# Patient Record
Sex: MALE | Employment: UNEMPLOYED | URBAN - METROPOLITAN AREA
[De-identification: names, ages, dates, MRNs, and addresses within clinical notes are randomized per-mention and may not be internally consistent; named-entity substitution may affect disease eponyms.]

---

## 2017-02-24 ENCOUNTER — OFFICE VISIT (OUTPATIENT)
Dept: FAMILY MEDICINE | Facility: CLINIC | Age: 22
End: 2017-02-24
Payer: COMMERCIAL

## 2017-02-24 VITALS
BODY MASS INDEX: 20.52 KG/M2 | WEIGHT: 151.5 LBS | OXYGEN SATURATION: 97 % | RESPIRATION RATE: 23 BRPM | TEMPERATURE: 98.2 F | HEIGHT: 72 IN | DIASTOLIC BLOOD PRESSURE: 62 MMHG | SYSTOLIC BLOOD PRESSURE: 108 MMHG | HEART RATE: 83 BPM

## 2017-02-24 DIAGNOSIS — E10.9 TYPE 1 DIABETES MELLITUS WITHOUT COMPLICATION (H): Primary | ICD-10-CM

## 2017-02-24 LAB
BASOPHILS # BLD AUTO: 0.1 10E9/L (ref 0–0.2)
BASOPHILS NFR BLD AUTO: 1.2 %
DIFFERENTIAL METHOD BLD: NORMAL
EOSINOPHIL # BLD AUTO: 0.1 10E9/L (ref 0–0.7)
EOSINOPHIL NFR BLD AUTO: 2.3 %
ERYTHROCYTE [DISTWIDTH] IN BLOOD BY AUTOMATED COUNT: 11.9 % (ref 10–15)
HBA1C MFR BLD: 9.6 % (ref 4.3–6)
HCT VFR BLD AUTO: 45.5 % (ref 40–53)
HGB BLD-MCNC: 15.5 G/DL (ref 13.3–17.7)
LYMPHOCYTES # BLD AUTO: 1.4 10E9/L (ref 0.8–5.3)
LYMPHOCYTES NFR BLD AUTO: 32.6 %
MCH RBC QN AUTO: 31.1 PG (ref 26.5–33)
MCHC RBC AUTO-ENTMCNC: 34.1 G/DL (ref 31.5–36.5)
MCV RBC AUTO: 91 FL (ref 78–100)
MONOCYTES # BLD AUTO: 0.5 10E9/L (ref 0–1.3)
MONOCYTES NFR BLD AUTO: 10.5 %
NEUTROPHILS # BLD AUTO: 2.3 10E9/L (ref 1.6–8.3)
NEUTROPHILS NFR BLD AUTO: 53.4 %
PLATELET # BLD AUTO: 205 10E9/L (ref 150–450)
RBC # BLD AUTO: 4.99 10E12/L (ref 4.4–5.9)
WBC # BLD AUTO: 4.3 10E9/L (ref 4–11)

## 2017-02-24 PROCEDURE — 83036 HEMOGLOBIN GLYCOSYLATED A1C: CPT | Performed by: PHYSICIAN ASSISTANT

## 2017-02-24 PROCEDURE — 36415 COLL VENOUS BLD VENIPUNCTURE: CPT | Performed by: PHYSICIAN ASSISTANT

## 2017-02-24 PROCEDURE — 99203 OFFICE O/P NEW LOW 30 MIN: CPT | Performed by: PHYSICIAN ASSISTANT

## 2017-02-24 PROCEDURE — 85025 COMPLETE CBC W/AUTO DIFF WBC: CPT | Performed by: PHYSICIAN ASSISTANT

## 2017-02-24 PROCEDURE — 80053 COMPREHEN METABOLIC PANEL: CPT | Performed by: PHYSICIAN ASSISTANT

## 2017-02-24 PROCEDURE — 99207 C FOOT EXAM  NO CHARGE: CPT | Performed by: PHYSICIAN ASSISTANT

## 2017-02-24 RX ORDER — NAPROXEN SODIUM 220 MG
TABLET ORAL
Refills: 4 | COMMUNITY
Start: 2016-06-10 | End: 2017-02-24

## 2017-02-24 RX ORDER — LANCETS 30 GAUGE
EACH MISCELLANEOUS
COMMUNITY
Start: 2016-06-10

## 2017-02-24 RX ORDER — NAPROXEN SODIUM 220 MG
TABLET ORAL
Qty: 1 EACH | Refills: 3 | Status: SHIPPED | OUTPATIENT
Start: 2017-02-24

## 2017-02-24 RX ORDER — INSULIN GLARGINE 100 [IU]/ML
INJECTION, SOLUTION SUBCUTANEOUS
COMMUNITY
Start: 2016-12-06 | End: 2017-02-24

## 2017-02-24 NOTE — MR AVS SNAPSHOT
After Visit Summary   2/24/2017    Sean Perez    MRN: [id number]           Patient Information     Date Of Birth          1995        Visit Information        Provider Department      2/24/2017 2:20 PM Jane Cruz PA-C [facility][note 3]        Today's Diagnoses     Type 1 diabetes mellitus without complication (H)    -  1      Care Instructions    Labs updated today.  Refills sent to pharmacy.  Continue to avoid processed foods.  Change Lantus 10-20 units nightly, adjust every few nights as tolerated.  Follow up with endocrinology.  Return to clinic with any worsening or changes in symptoms and follow up for routine care.         Follow-ups after your visit        Additional Services     ENDOCRINOLOGY ADULT REFERRAL       Your provider has referred you to: [facility]: Endocrinology and Diabetes Clinic [facility] [phone]   [email or web address]      Please be aware that coverage of these services is subject to the terms and limitations of your health insurance plan.  Call member services at your health plan with any benefit or coverage questions.      Please bring the following to your appointment:    >>   Any x-rays, CTs or MRIs which have been performed.  Contact the facility where they were done to arrange for  prior to your scheduled appointment.    >>   List of current medications   >>   This referral request   >>   Any documents/labs given to you for this referral                  Follow-up notes from your care team     Return in about 6 months (around 8/24/2017), or if symptoms worsen or fail to improve.      Who to contact     If you have questions or need follow up information about today's clinic visit or your schedule please contact [facility] directly at [phone].  Normal or non-critical lab and imaging results will be communicated to you by MyChart, letter or phone within 4 business  "days after the clinic has received the results. If you do not hear from us within 7 days, please contact the clinic through [email or web address] or phone. If you have a critical or abnormal lab result, we will notify you by phone as soon as possible.  Submit refill requests through [email or web address] or call your pharmacy and they will forward the refill request to us. Please allow 3 business days for your refill to be completed.          Additional Information About Your Visit        [email or web address] Information     [email or web address] lets you send messages to your doctor, view your test results, renew your prescriptions, schedule appointments and more. To sign up, go to [email or web address] . Click on \"Log in\" on the left side of the screen, which will take you to the Welcome page. Then click on \"Sign up Now\" on the right side of the page.     You will be asked to enter the access code listed below, as well as some personal information. Please follow the directions to create your username and password.     Your access code is: [email or web address]  Expires: 2017  6:31 AM     Your access code will  in 90 days. If you need help or a new code, please call your [place] clinic or [phone].        Care EveryWhere ID     This is your Care EveryWhere ID. This could be used by other organizations to access your [place] medical records  [id number]        Your Vitals Were     Pulse Temperature Respirations Height Pulse Oximetry BMI (Body Mass Index)    83 98.2  F (36.8  C) (Oral) 23 6' (1.829 m) 97% 20.55 kg/m2       Blood Pressure from Last 3 Encounters:   17 108/62    Weight from Last 3 Encounters:   17 151 lb 8 oz (68.7 kg)              We Performed the Following     CBC with platelets differential     Comprehensive metabolic panel     ENDOCRINOLOGY ADULT REFERRAL     FOOT EXAM     Hemoglobin A1c          Today's Medication Changes          These changes are accurate as of: 17  2:57 PM.  If you have any questions, ask your nurse or " "doctor.               Start taking these medicines.        Dose/Directions    insulin lispro 100 UNIT/ML injection   Commonly known as:  HumaLOG PEN   Used for:  Type 1 diabetes mellitus without complication (H)   Replaces:  insulin lispro 100 UNIT/ML injection   Started by:  Jane Cruz PA-C        1 unit for every 10 carbs and 1 unit correction for every 50 over 150   Quantity:  10 mL   Refills:  3         These medicines have changed or have updated prescriptions.        Dose/Directions    blood glucose monitoring test strip   Commonly known as:  ACCU-CHEK SMARTVIEW   This may have changed:  See the new instructions.   Used for:  Type 1 diabetes mellitus without complication (H)   Changed by:  Jane Cruz PA-C        TEST 4 TIMES A DAY AS DIRECTED   Quantity:  1 Box   Refills:  3       insulin glargine 100 UNIT/ML injection   Commonly known as:  LANTUS   This may have changed:  See the new instructions.   Used for:  Type 1 diabetes mellitus without complication (H)   Changed by:  Jane Cruz PA-C        Dose:  20 Units   Inject 20 Units Subcutaneous At Bedtime   Quantity:  10 mL   Refills:  3       * insulin syringe-needle U-100 30G X 1/2\" 0.3 ML   This may have changed:  See the new instructions.   Used for:  Type 1 diabetes mellitus without complication (H)   Changed by:  Jane Cruz PA-C        4-6 times daily   Quantity:  100 each   Refills:  3       * Insulin Syringe 30G X 5/16\" 0.3 ML Misc   This may have changed:  See the new instructions.   Used for:  Type 1 diabetes mellitus without complication (H)   Changed by:  Jane Cruz PA-C        USE 4-6 TIMES DAILY   Quantity:  1 each   Refills:  3       * Notice:  This list has 2 medication(s) that are the same as other medications prescribed for you. Read the directions carefully, and ask your doctor or other care provider to review them with you.      Stop taking these medicines if you " "haven't already. Please contact your care team if you have questions.     HumaLOG KWIKpen 100 UNIT/ML injection   Generic drug:  insulin lispro   Stopped by:  Jane Cruz PA-C           insulin aspart 100 UNIT/ML injection   Commonly known as:  NovoLOG PEN   Stopped by:  Jane Cruz PA-C           insulin detemir 100 UNIT/ML injection   Commonly known as:  LEVEMIR   Stopped by:  Jane Cruz PA-C           insulin lispro 100 UNIT/ML injection   Commonly known as:  HumaLOG   Replaced by:  insulin lispro 100 UNIT/ML injection   Stopped by:  Jane Cruz PA-C                Where to get your medicines      These medications were sent to [facility] [address]  [address] [facility] [address]     Phone:  [phone]     blood glucose monitoring test strip    insulin glargine 100 UNIT/ML injection    insulin lispro 100 UNIT/ML injection    Insulin Syringe 30G X 5/16\" 0.3 ML Misc    insulin syringe-needle U-100 30G X 1/2\" 0.3 ML                Primary Care Provider Office Phone # Fax #    Jane Cruz PA-C [phone]       [facility]       [address]            [facility] [address]        Thank you!     Thank you for choosing [facility]  for your care. Our goal is always to provide you with excellent care. Hearing back from our patients is one way we can continue to improve our services. Please take a few minutes to complete the written survey that you may receive in the mail after your visit with us. Thank you!             Your Updated Medication List - Protect others around you: Learn how to safely use, store and throw away your medicines at www.disposemymeds.org.          This list is accurate as of: 2/24/17  2:57 PM.  Always use your most recent med list.                   Brand Name Dispense Instructions for use    blood glucose monitoring test strip    " "ACCU-CHEK SMARTVIEW    1 Box    TEST 4 TIMES A DAY AS DIRECTED       GLUCAGEN HYPOKIT 1 MG Solr injection   Generic drug:  glucagon      INJECT 1 MG IN THE MUSCLE FOR DIABETIC SEIZURE OR UNCONSCIOUSNESS AS DIRECTED       insulin glargine 100 UNIT/ML injection    LANTUS    10 mL    Inject 20 Units Subcutaneous At Bedtime       insulin lispro 100 UNIT/ML injection    HumaLOG PEN    10 mL    1 unit for every 10 carbs and 1 unit correction for every 50 over 150       * insulin syringe-needle U-100 30G X 1/2\" 0.3 ML     100 each    4-6 times daily       * Insulin Syringe 30G X 5/16\" 0.3 ML Misc     1 each    USE 4-6 TIMES DAILY       Lancets Misc      4-6 timesdaily       * Notice:  This list has 2 medication(s) that are the same as other medications prescribed for you. Read the directions carefully, and ask your doctor or other care provider to review them with you.      "

## 2017-02-24 NOTE — PATIENT INSTRUCTIONS
Labs updated today.  Refills sent to pharmacy.  Continue to avoid processed foods.  Change Lantus 10-20 units nightly, adjust every few nights as tolerated/needed.  Follow up with endocrinology.  Return to clinic with any worsening or changes in symptoms and follow up for routine care.

## 2017-02-24 NOTE — PROGRESS NOTES
SUBJECTIVE:                                                    Sean Perez is a 21 year old male who presents to clinic today for the following health issues:    Diabetes Follow-up  Patient is checking blood sugars: 2-3 times daily.    Afternoon/[id number]  Results are as follows:    Diabetic concerns: None     Symptoms of hypoglycemia (low blood sugar): shaky     Paresthesias (numbness or burning in feet) or sores: No     Date of last diabetic eye exam: Dec 2016       Amount of exercise or physical activity: 4-5 days/week for an average of greater than 60 minutes    Problems taking medications regularly: Yes,  problems remembering to take    Medication side effects: none    Diet: regular (no restrictions)    Taking Levemir/Lantus - 20 units before bedtime and Novolog/Humalog 40-50 units per day (1 unit for every 10 carbs and 1 unit correction for every 50 over 150)    Patient seeing endocrinology through [facility], but change of insurance switched endocrinology.  Plans to get in with endocrinology in near future again though.    Most recent HgbA1C was 10.2 or so a few months ago - told to decreased processed foods for improvement in numbers, which he has done.  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 Will take it once every 3 nights sometimes.  History of being on 18 units in the past with better results.      Problem list and histories reviewed & adjusted, as indicated.  Additional history: as documented    Patient Active Problem List   Diagnosis     Type 1 diabetes mellitus without complication (H)     History reviewed. No pertinent past surgical history.    Social History   Substance Use Topics     Smoking status: Never Smoker     Smokeless tobacco: Not on file     Alcohol use Yes      Comment: once a month     Family History   Problem Relation Age of Onset     Hyperlipidemia Mother      Migraines Mother      OSTEOPOROSIS Mother      Type 2  "Diabetes Other      Hypertension Other          Current Outpatient Prescriptions   Medication Sig Dispense Refill     glucagon (GLUCAGEN HYPOKIT) 1 MG SOLR injection INJECT 1 MG IN THE MUSCLE FOR DIABETIC SEIZURE OR UNCONSCIOUSNESS AS DIRECTED       Lancets MISC 4-6 timesdaily       insulin lispro (HUMALOG PEN) 100 UNIT/ML injection 1 unit for every 10 carbs and 1 unit correction for every 50 over 150 10 mL 3     insulin glargine (LANTUS) 100 UNIT/ML injection Inject 20 Units Subcutaneous At Bedtime 10 mL 3     insulin syringe-needle U-100 30G X 1/2\" 0.3 ML 4-6 times daily 100 each 3     blood glucose monitoring (ACCU-CHEK SMARTVIEW) test strip TEST 4 TIMES A DAY AS DIRECTED 1 Box 3     Insulin Syringe 30G X 5/16\" 0.3 ML MISC USE 4-6 TIMES DAILY 1 each 3     [DISCONTINUED] insulin glargine (LANTUS) 100 UNIT/ML injection Inject Subcutaneous At Bedtime       [DISCONTINUED] insulin lispro (HUMALOG KWIKPEN) 100 UNIT/ML injection Inject Subcutaneous 3 times daily (before meals)       [DISCONTINUED] insulin lispro (HUMALOG) 100 UNIT/ML injection Inject 40-50 Units Subcutaneous       [DISCONTINUED] insulin glargine (LANTUS) 100 UNIT/ML injection Inject 20 units subcutaneously daily.       Allergies   Allergen Reactions     Dust Mites        ROS:  Constitutional, HEENT, cardiovascular, pulmonary, gi and gu systems are negative, except as otherwise noted.    OBJECTIVE:                                                    /62 (BP Location: Left arm, Patient Position: Chair, Cuff Size: Adult Regular)  Pulse 83  Temp 98.2  F (36.8  C) (Oral)  Resp 23  Ht 6' (1.829 m)  Wt 151 lb 8 oz (68.7 kg)  SpO2 97%  BMI 20.55 kg/m2  Body mass index is 20.55 kg/(m^2).  GENERAL: healthy, alert and no distress  EYES: Eyes grossly normal to inspection, PERRL and conjunctivae and sclerae normal  HENT: ear canals and TM's normal, nose and mouth without ulcers or lesions  RESP: lungs clear to auscultation - no rales, rhonchi or wheezes  CV: " regular rate and rhythm, normal S1 S2, no S3 or S4, no murmur, click or rub, no peripheral edema and peripheral pulses strong  ABDOMEN: soft, nontender, no hepatosplenomegaly, no masses and bowel sounds normal  MS: no gross musculoskeletal defects noted, no edema  PSYCH: mentation appears normal, affect normal/bright  Diabetic foot exam: normal DP and PT pulses, no trophic changes or ulcerative lesions and normal sensory exam    Diagnostic Test Results:  Results for orders placed or performed in visit on 02/24/17   CBC with platelets differential   Result Value Ref Range    WBC 4.3 4.0 - 11.0 10e9/L    RBC Count 4.99 4.4 - 5.9 10e12/L    Hemoglobin 15.5 13.3 - 17.7 g/dL    Hematocrit 45.5 40.0 - 53.0 %    MCV 91 78 - 100 fl    MCH 31.1 26.5 - 33.0 pg    MCHC 34.1 31.5 - 36.5 g/dL    RDW 11.9 10.0 - 15.0 %    Platelet Count 205 150 - 450 10e9/L    Diff Method Automated Method     % Neutrophils 53.4 %    % Lymphocytes 32.6 %    % Monocytes 10.5 %    % Eosinophils 2.3 %    % Basophils 1.2 %    Absolute Neutrophil 2.3 1.6 - 8.3 10e9/L    Absolute Lymphocytes 1.4 0.8 - 5.3 10e9/L    Absolute Monocytes 0.5 0.0 - 1.3 10e9/L    Absolute Eosinophils 0.1 0.0 - 0.7 10e9/L    Absolute Basophils 0.1 0.0 - 0.2 10e9/L   Hemoglobin A1c   Result Value Ref Range    Hemoglobin A1C 9.6 (H) 4.3 - 6.0 %         ASSESSMENT/PLAN:                                                    Diabetes Type I, A1c Controlled , insulin dependent   Associated with the following complications    Renal Complications:  None    Ophthalmologic Complications: None    Neurologic Complications: None    Peripheral Vascular Complications:  None    Other: None   Plan:  Labs:  See orders, A1C and Complete Metabolic Panel  Continue short acting insulin as directed  Make sure taking Lantus nightly though - 10 units every night to start then can increase 2-3 units if needed for better control.  Endocrinology referral placed today as well.  Medications:  Insulin -  "refills sent to pharmacy.        ICD-10-CM    1. Type 1 diabetes mellitus without complication (H) E10.9 CBC with platelets differential     Comprehensive metabolic panel     Hemoglobin A1c     ENDOCRINOLOGY ADULT REFERRAL     FOOT EXAM     insulin lispro (HUMALOG PEN) 100 UNIT/ML injection     insulin glargine (LANTUS) 100 UNIT/ML injection     insulin syringe-needle U-100 30G X 1/2\" 0.3 ML     blood glucose monitoring (ACCU-CHEK SMARTVIEW) test strip     Insulin Syringe 30G X 5/16\" 0.3 ML MISC       Patient Instructions   Labs updated today.  Refills sent to pharmacy.  Continue to avoid processed foods.  Change Lantus 10-20 units nightly, adjust every few nights as tolerated/needed.  Follow up with endocrinology.  Return to clinic with any worsening or changes in symptoms and follow up for routine care.       Jane Cruz PA-C  [facility]  "

## 2017-02-24 NOTE — LETTER
[facility]   [address] [place]   [address]  [phone]    February 28, 2017      Sean Perez  [address] [facility] [address]              Dear Mr. Perez,    Your glucose is quiet high which is consistent with high A1c.   Your kidney and liver functions are fine.    Grzegorz Moctezuma MD behalf of Dima Cruz    Results for orders placed or performed in visit on 02/24/17   CBC with platelets differential   Result Value Ref Range    WBC 4.3 4.0 - 11.0 10e9/L    RBC Count 4.99 4.4 - 5.9 10e12/L    Hemoglobin 15.5 13.3 - 17.7 g/dL    Hematocrit 45.5 40.0 - 53.0 %    MCV 91 78 - 100 fl    MCH 31.1 26.5 - 33.0 pg    MCHC 34.1 31.5 - 36.5 g/dL    RDW 11.9 10.0 - 15.0 %    Platelet Count 205 150 - 450 10e9/L    Diff Method Automated Method     % Neutrophils 53.4 %    % Lymphocytes 32.6 %    % Monocytes 10.5 %    % Eosinophils 2.3 %    % Basophils 1.2 %    Absolute Neutrophil 2.3 1.6 - 8.3 10e9/L    Absolute Lymphocytes 1.4 0.8 - 5.3 10e9/L    Absolute Monocytes 0.5 0.0 - 1.3 10e9/L    Absolute Eosinophils 0.1 0.0 - 0.7 10e9/L    Absolute Basophils 0.1 0.0 - 0.2 10e9/L   Comprehensive metabolic panel   Result Value Ref Range    Sodium 138 133 - 144 mmol/L    Potassium 4.1 3.4 - 5.3 mmol/L    Chloride 101 94 - 109 mmol/L    Carbon Dioxide 30 20 - 32 mmol/L    Anion Gap 7 3 - 14 mmol/L    Glucose 397 (H) 70 - 99 mg/dL    Urea Nitrogen 14 7 - 30 mg/dL    Creatinine 0.81 0.66 - 1.25 mg/dL    GFR Estimate >90  Non  GFR Calc   >60 mL/min/1.7m2    GFR Estimate If Black >90   GFR Calc   >60 mL/min/1.7m2    Calcium 9.3 8.5 - 10.1 mg/dL    Bilirubin Total 0.8 0.2 - 1.3 mg/dL    Albumin 4.2 3.4 - 5.0 g/dL    Protein Total 6.9 6.8 - 8.8 g/dL    Alkaline Phosphatase 91 40 - 150 U/L    ALT 16 0 - 70 U/L    AST 11 0 - 45 U/L   Hemoglobin A1c   Result Value Ref Range    Hemoglobin A1C 9.6 (H) 4.3 - 6.0 %           Sincerely,    Grzegorz Moctezuma MD/nr  Grzegorz  Jose Elias HAAS behalf of Dima Cruz

## 2017-02-24 NOTE — NURSING NOTE
Chief Complaint   Patient presents with     Diabetes       Initial /62 (BP Location: Left arm, Patient Position: Chair, Cuff Size: Adult Regular)  Pulse 83  Temp 98.2  F (36.8  C) (Oral)  Resp 23  Ht 6' (1.829 m)  Wt 151 lb 8 oz (68.7 kg)  SpO2 97%  BMI 20.55 kg/m2 Estimated body mass index is 20.55 kg/(m^2) as calculated from the following:    Height as of this encounter: 6' (1.829 m).    Weight as of this encounter: 151 lb 8 oz (68.7 kg).  Medication Reconciliation: complete     Nou Lavonne, CMA

## 2017-02-25 LAB
ALBUMIN SERPL-MCNC: 4.2 G/DL (ref 3.4–5)
ALP SERPL-CCNC: 91 U/L (ref 40–150)
ALT SERPL W P-5'-P-CCNC: 16 U/L (ref 0–70)
ANION GAP SERPL CALCULATED.3IONS-SCNC: 7 MMOL/L (ref 3–14)
AST SERPL W P-5'-P-CCNC: 11 U/L (ref 0–45)
BILIRUB SERPL-MCNC: 0.8 MG/DL (ref 0.2–1.3)
BUN SERPL-MCNC: 14 MG/DL (ref 7–30)
CALCIUM SERPL-MCNC: 9.3 MG/DL (ref 8.5–10.1)
CHLORIDE SERPL-SCNC: 101 MMOL/L (ref 94–109)
CO2 SERPL-SCNC: 30 MMOL/L (ref 20–32)
CREAT SERPL-MCNC: 0.81 MG/DL (ref 0.66–1.25)
GFR SERPL CREATININE-BSD FRML MDRD: ABNORMAL ML/MIN/1.7M2
GLUCOSE SERPL-MCNC: 397 MG/DL (ref 70–99)
POTASSIUM SERPL-SCNC: 4.1 MMOL/L (ref 3.4–5.3)
PROT SERPL-MCNC: 6.9 G/DL (ref 6.8–8.8)
SODIUM SERPL-SCNC: 138 MMOL/L (ref 133–144)

## 2017-04-06 DIAGNOSIS — E10.9 TYPE 1 DIABETES MELLITUS WITHOUT COMPLICATION (H): ICD-10-CM

## 2017-04-06 NOTE — TELEPHONE ENCOUNTER
Pt would like to receive 3 vials of both insulins at a time for 3 months.  If this is ok, please send new rx for 3 vials.  I've queued both up for you.  I didn't add refills or associate a diagnosis code, so you may want to do that as well.     He is using vials, not pens.  Each vial should be used only for 28 days, so 3 vials is a 3-month supply even though he wouldn't necessarily use it all.     Thanks-    Marycarmen Galdamez, [facility]  [phone]

## 2017-05-15 NOTE — TELEPHONE ENCOUNTER
Pts mother, Kizzy, stopped by  very upset that pt has again only been given a 1 month supply rather than a 3 month. Please advise.    Ruby Crook  Patient Representative

## 2017-05-15 NOTE — TELEPHONE ENCOUNTER
--I do not see that we have consent to communicate with mother.  --I see in this encounter that pharmacist cued a three month supply of both meds for Dima to sign off on 4/7/17.  --I called Marycarmen in our pharmacy to review the orders for insulin in Sean's chart.  --Marycarmen agrees with me that he does have orders for both insulins for at least a 3 month supply.      --I called Sean and told him that his mother was concerned that he was not getting the correct supply of his insulins.  --Sean did not express any concern about this to me.  --I told Sean that we are not able to share medical info with his mother without a consent to communicate.  --I also told Sean that I discussed his orders with Marycarmen in the pharmacy and she does have an order for both insulins for at least a three month supply.    --I also attempted to call Sean's mother to let her know about confidentiality but she is not at home.      Kavitha Kaye RN

## 2017-07-08 DIAGNOSIS — E10.9 TYPE 1 DIABETES MELLITUS WITHOUT COMPLICATION (H): ICD-10-CM

## 2017-07-08 NOTE — TELEPHONE ENCOUNTER
"Medication Detail      Disp Refills Start End JULIANNA   Insulin Syringe 30G X 5/16\" 0.3 ML MISC 1 each 3 2/24/2017  No   Sig: USE 4-6 TIMES DAILY   Class: E-Prescribe   Notes to Pharmacy: Patient prefers shorter needle option of these 2   Order: [id number]        Last Office Visit with [facility], P or [facility] prescribing provider: 2/24/2017                                               "

## 2017-07-10 RX ORDER — SYRING-NEEDL,DISP,INSUL,0.3 ML 30 G X1/2"
SYRINGE, EMPTY DISPOSABLE MISCELLANEOUS
Qty: 180 EACH | Refills: 1 | Status: SHIPPED | OUTPATIENT
Start: 2017-07-10 | End: 2017-10-30

## 2017-10-30 DIAGNOSIS — E10.9 TYPE 1 DIABETES MELLITUS WITHOUT COMPLICATION (H): ICD-10-CM

## 2017-11-01 NOTE — TELEPHONE ENCOUNTER
Reason for Call:  Medication or medication refill:    Do you use a [facility]?  Name of the pharmacy and phone number for the current request:  [facility] - [phone]    Name of the medication requested: HUMALOG 100 UNIT/ML injection, test strips, & pen needles    Other request: Patients mother is requesting a refill. Patient has only 1 days work left. Please advise, thank you!    Can we leave a detailed message on this number? Not Applicable    Phone number patient can be reached at: [phone]    Best Time: anytime    Call taken on 11/1/2017 at 2:14 PM by Elana Ramos

## 2017-11-02 RX ORDER — SYRING-NEEDL,DISP,INSUL,0.3 ML 30 G X1/2"
SYRINGE, EMPTY DISPOSABLE MISCELLANEOUS
Qty: 200 EACH | Refills: 1 | Status: SHIPPED | OUTPATIENT
Start: 2017-11-02

## 2017-11-02 NOTE — TELEPHONE ENCOUNTER
Is totally out of his Humalog insulin. Asked to have this sent to a pharmacy that is open.  I called it in to [facility] [place] [place] per Sean's mother's request. I called in the following:  HUMALOG 100 UNIT/ML injection [Pharmacy Med Name: HUMALOG 100UNIT/ML SOLN]  INJECT 1 UNIT UNDER THE SKIN FOR EVERY 10 CARBS, AND ONE UNIT CORRECTION FOR EVERY 50 OVER 150, Disp-30 mL, R-0, E-Prescribe  Routed:P 32834  ALBERTO Agee RN [facility]

## 2017-11-02 NOTE — TELEPHONE ENCOUNTER
Devaughn Augustine,  --I am not able to see the order sent.  --Did you sent via EPIC?  --I see order still in order screen.  --Did you happen to order the supplies that were requested too?    I started a ticket on this encounter so am leaving it as is right now.    Thank you,  Kavitha Kaye RN

## 2019-02-21 DIAGNOSIS — E10.9 TYPE 1 DIABETES MELLITUS WITHOUT COMPLICATION (H): ICD-10-CM

## 2019-02-25 NOTE — TELEPHONE ENCOUNTER
"Requested Prescriptions   Pending Prescriptions Disp Refills     insulin syringe-needle U-100 (BD INSULIN SYRINGE ULTRAFINE) 30G X 1/2\" 0.3 ML miscellaneous  Last Written Prescription Date:  4/7/2017  Last Fill Quantity: 30ml,  # refills: 0   Last Office Visit: 2/24/2017   Future Office Visit:      200 each 1     Sig: Use  syringes daily or as directed.    Diabetic Supplies Protocol Failed - 2/21/2019  4:59 PM       Failed - Recent (6 mo) or future (30 days) visit within the authorizing provider's specialty    Patient had office visit in the last 6 months or has a visit in the next 30 days with authorizing provider.  See \"Patient Info\" tab in inbasket, or \"Choose Columns\" in Meds & Orders section of the refill encounter.           Passed - Medication is active on med list       Passed - Patient is 18 years of age or older          "

## 2019-02-25 NOTE — TELEPHONE ENCOUNTER
Patient has not been seen at [facility] for two years and we have not ordered anything for about that long. I called pharmacy and they say since they sent us this request they have changed their request to a Dr.Mucha ( I am not sure of that spelling.) I told pharmacy if they have trouble getting requests authorized to let us know.

## 2022-01-27 NOTE — TELEPHONE ENCOUNTER
Prescription approved per [facility] Refill Protocol.  ANUP Arthru     no...

## 2023-03-25 ENCOUNTER — HOSPITAL ENCOUNTER (EMERGENCY)
Facility: CLINIC | Age: 28
Discharge: HOME HEALTH | End: 2023-03-25
Attending: EMERGENCY MEDICINE | Admitting: EMERGENCY MEDICINE
Payer: COMMERCIAL

## 2023-03-25 ENCOUNTER — APPOINTMENT (OUTPATIENT)
Dept: GENERAL RADIOLOGY | Facility: CLINIC | Age: 28
End: 2023-03-25
Attending: EMERGENCY MEDICINE
Payer: COMMERCIAL

## 2023-03-25 ENCOUNTER — TELEPHONE (OUTPATIENT)
Dept: NURSING | Facility: CLINIC | Age: 28
End: 2023-03-25

## 2023-03-25 ENCOUNTER — NURSE TRIAGE (OUTPATIENT)
Dept: NURSING | Facility: CLINIC | Age: 28
End: 2023-03-25

## 2023-03-25 VITALS
OXYGEN SATURATION: 98 % | HEIGHT: 72 IN | HEART RATE: 95 BPM | DIASTOLIC BLOOD PRESSURE: 79 MMHG | TEMPERATURE: 98.8 F | WEIGHT: 173.94 LBS | BODY MASS INDEX: 23.56 KG/M2 | SYSTOLIC BLOOD PRESSURE: 107 MMHG | RESPIRATION RATE: 18 BRPM

## 2023-03-25 DIAGNOSIS — J06.9 VIRAL URI WITH COUGH: ICD-10-CM

## 2023-03-25 DIAGNOSIS — R07.81 PLEURITIC CHEST PAIN: ICD-10-CM

## 2023-03-25 LAB
ANION GAP SERPL CALCULATED.3IONS-SCNC: 14 MMOL/L (ref 7–15)
BASOPHILS # BLD AUTO: 0 10E3/UL (ref 0–0.2)
BASOPHILS NFR BLD AUTO: 0 %
BUN SERPL-MCNC: 13.8 MG/DL (ref 6–20)
CALCIUM SERPL-MCNC: 9.7 MG/DL (ref 8.6–10)
CHLORIDE SERPL-SCNC: 100 MMOL/L (ref 98–107)
CREAT SERPL-MCNC: 0.83 MG/DL (ref 0.67–1.17)
D DIMER PPP FEU-MCNC: <0.27 UG/ML FEU (ref 0–0.5)
DEPRECATED HCO3 PLAS-SCNC: 25 MMOL/L (ref 22–29)
EOSINOPHIL # BLD AUTO: 0.1 10E3/UL (ref 0–0.7)
EOSINOPHIL NFR BLD AUTO: 1 %
ERYTHROCYTE [DISTWIDTH] IN BLOOD BY AUTOMATED COUNT: 11.5 % (ref 10–15)
FLUAV RNA SPEC QL NAA+PROBE: NEGATIVE
FLUBV RNA RESP QL NAA+PROBE: NEGATIVE
GFR SERPL CREATININE-BSD FRML MDRD: >90 ML/MIN/1.73M2
GLUCOSE SERPL-MCNC: 158 MG/DL (ref 70–99)
HCT VFR BLD AUTO: 43.3 % (ref 40–53)
HGB BLD-MCNC: 15.1 G/DL (ref 13.3–17.7)
IMM GRANULOCYTES # BLD: 0 10E3/UL
IMM GRANULOCYTES NFR BLD: 0 %
LYMPHOCYTES # BLD AUTO: 1.4 10E3/UL (ref 0.8–5.3)
LYMPHOCYTES NFR BLD AUTO: 16 %
MCH RBC QN AUTO: 30.6 PG (ref 26.5–33)
MCHC RBC AUTO-ENTMCNC: 34.9 G/DL (ref 31.5–36.5)
MCV RBC AUTO: 88 FL (ref 78–100)
MONOCYTES # BLD AUTO: 0.8 10E3/UL (ref 0–1.3)
MONOCYTES NFR BLD AUTO: 9 %
NEUTROPHILS # BLD AUTO: 6.8 10E3/UL (ref 1.6–8.3)
NEUTROPHILS NFR BLD AUTO: 74 %
NRBC # BLD AUTO: 0 10E3/UL
NRBC BLD AUTO-RTO: 0 /100
PLATELET # BLD AUTO: 204 10E3/UL (ref 150–450)
POTASSIUM SERPL-SCNC: 3.7 MMOL/L (ref 3.4–5.3)
RBC # BLD AUTO: 4.94 10E6/UL (ref 4.4–5.9)
RSV RNA SPEC NAA+PROBE: NEGATIVE
SARS-COV-2 RNA RESP QL NAA+PROBE: NEGATIVE
SODIUM SERPL-SCNC: 139 MMOL/L (ref 136–145)
TROPONIN T SERPL HS-MCNC: <6 NG/L
WBC # BLD AUTO: 9.2 10E3/UL (ref 4–11)

## 2023-03-25 PROCEDURE — 93005 ELECTROCARDIOGRAM TRACING: CPT | Performed by: EMERGENCY MEDICINE

## 2023-03-25 PROCEDURE — 71046 X-RAY EXAM CHEST 2 VIEWS: CPT

## 2023-03-25 PROCEDURE — 85379 FIBRIN DEGRADATION QUANT: CPT | Performed by: EMERGENCY MEDICINE

## 2023-03-25 PROCEDURE — 36415 COLL VENOUS BLD VENIPUNCTURE: CPT | Performed by: EMERGENCY MEDICINE

## 2023-03-25 PROCEDURE — 84484 ASSAY OF TROPONIN QUANT: CPT | Performed by: EMERGENCY MEDICINE

## 2023-03-25 PROCEDURE — C9803 HOPD COVID-19 SPEC COLLECT: HCPCS | Performed by: EMERGENCY MEDICINE

## 2023-03-25 PROCEDURE — 99285 EMERGENCY DEPT VISIT HI MDM: CPT | Mod: 25 | Performed by: EMERGENCY MEDICINE

## 2023-03-25 PROCEDURE — 80048 BASIC METABOLIC PNL TOTAL CA: CPT | Performed by: EMERGENCY MEDICINE

## 2023-03-25 PROCEDURE — 87637 SARSCOV2&INF A&B&RSV AMP PRB: CPT | Performed by: EMERGENCY MEDICINE

## 2023-03-25 PROCEDURE — 93010 ELECTROCARDIOGRAM REPORT: CPT | Performed by: EMERGENCY MEDICINE

## 2023-03-25 PROCEDURE — 85025 COMPLETE CBC W/AUTO DIFF WBC: CPT | Performed by: EMERGENCY MEDICINE

## 2023-03-25 PROCEDURE — 99285 EMERGENCY DEPT VISIT HI MDM: CPT | Mod: 25,CS | Performed by: EMERGENCY MEDICINE

## 2023-03-25 RX ORDER — LIDOCAINE 4 G/G
1 PATCH TOPICAL EVERY 24 HOURS
Qty: 12 PATCH | Refills: 0 | Status: SHIPPED | OUTPATIENT
Start: 2023-03-25

## 2023-03-25 RX ORDER — NAPROXEN 500 MG/1
500 TABLET ORAL 2 TIMES DAILY PRN
Qty: 30 TABLET | Refills: 0 | Status: SHIPPED | OUTPATIENT
Start: 2023-03-25

## 2023-03-25 RX ORDER — LIDOCAINE 4 G/G
1 PATCH TOPICAL EVERY 24 HOURS
Qty: 12 PATCH | Refills: 0 | Status: SHIPPED | OUTPATIENT
Start: 2023-03-25 | End: 2023-03-25

## 2023-03-25 ASSESSMENT — ACTIVITIES OF DAILY LIVING (ADL): ADLS_ACUITY_SCORE: 33

## 2023-03-25 NOTE — ED TRIAGE NOTES
Pt having back and rib pain for two days, has had flu like symptoms fr one week.  Pt has been tested for COVID and strep, both negative.     Triage Assessment     Row Name 03/25/23 0108       Triage Assessment (Adult)    Airway WDL WDL       Respiratory WDL    Respiratory WDL WDL       Skin Circulation/Temperature WDL    Skin Circulation/Temperature WDL WDL       Cardiac WDL    Cardiac WDL WDL       Peripheral/Neurovascular WDL    Peripheral Neurovascular WDL WDL       Cognitive/Neuro/Behavioral WDL    Cognitive/Neuro/Behavioral WDL WDL

## 2023-03-25 NOTE — TELEPHONE ENCOUNTER
Telephone call  Mother calling to report that  Patient was seen in the ED last night he was siagnosied with Pleuritic pain and viral upper respiratory with cough and she just wanted to repor t that he now has a fever of 102.  No other changes she is going to check his insurance on Monday and see where his insurance covers so he can get a appointment and get established with a physician.  The patient gave verbal consent to speak with his Mom about his health care.    Luz Griffith RN   [facility] Nurse Advisor  3:35 PM 3/25/2023

## 2023-03-25 NOTE — DISCHARGE INSTRUCTIONS
Instructions from your doctor today:  Emergency Department (ED) testing is focused on the potential causes of your symptoms that are the most dangerous possibilities, and cannot cover every possibility. Based on the evaluation, it was deemed sufficiently safe to discharge and continue management through the clinics. Thus, follow-up is very important to assess for improvement/worsening, potential further testing, and potential treatment adjustments. If you were given opioid pain medications or other medications that can make you drowsy while in the ED, you should not drive for at least several hours and not until you feel completely back to normal.     Please make an appointment to follow up with:  - Your Primary Care Provider in 2-6 days  - If you do not have a primary care provider, you can be seen in follow-up and establish care by calling any of the clinics below:     - [facility] (phone: [phone])     - [facility] (phone: [phone])   - Have your clinic provider review the results from today's visit with you again, including any potential follow-up or additional testing that may be needed based on the results. Occasionally, incidental findings are found on later review by radiologists that may need follow-up.     Return to the Emergency Department immediately if you have difficulty breathing, worsening symptoms, or any other urgent health concerns.

## 2023-03-25 NOTE — ED PROVIDER NOTES
"  History     Chief Complaint   Patient presents with     Back Pain     Upper L side     Rib Pain     L side, Pt denies trauma or recent injury.  Pt having muscle tightness, pain with inspiration.     HPI  Sean Perez is a 27 year old male with PMH notable for DM1 who presents to the ED with left side thoracic pain.  Patient points to left lateral chest wall and left scapular area as location.  Patient reports pain onset about 24 hours ago.  No particular activity performed at the time of onset.  Pain significantly worsened around 11:30 PM tonight.  Patient notes associated cough and fever for the past week.  No recent vomiting.  He has had mild shortness of breath associated with the symptoms.  Pain is worsened with a deep breath.  No leg pain or swelling, no recent immobilization, no history malignancy, no history DVT/PE.     Past Medical History  Past Medical History:   Diagnosis Date     Diabetes type 1, controlled (H)      History reviewed. No pertinent surgical history.  HUMALOG 100 UNIT/ML injection  insulin glargine (LANTUS) 100 UNIT/ML injection  BD INSULIN SYRINGE ULTRAFINE 30G X 1/2\" 0.3 ML  blood glucose monitoring (ACCU-CHEK SMARTVIEW) test strip  glucagon (GLUCAGEN HYPOKIT) 1 MG SOLR injection  insulin lispro (HUMALOG PEN) 100 UNIT/ML injection  Insulin Syringe 30G X 5/16\" 0.3 ML MISC  Lancets MISC      Allergies   Allergen Reactions     Dust Mites      Family History  Family History   Problem Relation Age of Onset     Hyperlipidemia Mother      Migraines Mother      Osteoporosis Mother      Diabetes Type 2  Other      Hypertension Other      Social History   Social History     Tobacco Use     Smoking status: Never   Substance Use Topics     Alcohol use: Yes     Comment: once a month     Drug use: No         A medically appropriate review of systems was performed with pertinent positives and negatives noted in the HPI, and all other systems negative.    Physical Exam   BP: 105/68  Pulse: 103  Temp: " 98.8  F (37.1  C)  Resp: 18  Height: 182.9 cm (6')  Weight: 78.9 kg (173 lb 15.1 oz)  SpO2: 98 %    Physical Exam  General: no acute distress. Appears stated age.   HENT: MMM, no oropharyngeal lesions  Eyes: PERRL, normal sclerae  Neck: Mild tenderness in the left trapezius muscle on the lateral side of the neck, otherwise non-tender, supple  Cardio: Mildly tachycardic rate. Regular rhythm. Extremities well perfused  Resp: Normal work of breathing, normal respiratory rate.  Chest/Back: no visual signs of trauma, there is chest wall tenderness on the left lateral and posterior aspect  Neuro: alert and fully oriented. CN II-XII grossly intact. Grossly normal strength and sensation in all extremities.   MSK: no deformities. Grossly normal ROM in extremities.   Integumentary/Skin: no rash visualized, normal color  Psych: normal affect, normal behavior    ED Course      Procedures       ED Course Selections:        EKG Interpretation:      Interpreted by Everett Ovalle MD  Time reviewed: 0220  Symptoms at time of EKG: chest pain   Rhythm: normal sinus   Rate: 97  Axis: Normal  Ectopy: none  Conduction: normal  ST Segments/ T Waves: No ST-T wave changes and No acute ischemic changes  Q Waves: none  Comparison to prior: No old EKG available    Clinical Impression: normal EKG                   Labs Ordered and Resulted from Time of ED Arrival to Time of ED Departure   TROPONIN T, HIGH SENSITIVITY - Normal       Result Value    Troponin T, High Sensitivity <6     D DIMER QUANTITATIVE - Normal    D-Dimer Quantitative <0.27     INFLUENZA A/B, RSV, & SARS-COV2 PCR - Normal    Influenza A PCR Negative      Influenza B PCR Negative      RSV PCR Negative      SARS CoV2 PCR Negative     CBC WITH PLATELETS AND DIFFERENTIAL    WBC Count 9.2      RBC Count 4.94      Hemoglobin 15.1      Hematocrit 43.3      MCV 88      MCH 30.6      MCHC 34.9      RDW 11.5      Platelet Count 204      % Neutrophils 74      % Lymphocytes 16      %  Monocytes 9      % Eosinophils 1      % Basophils 0      % Immature Granulocytes 0      NRBCs per 100 WBC 0      Absolute Neutrophils 6.8      Absolute Lymphocytes 1.4      Absolute Monocytes 0.8      Absolute Eosinophils 0.1      Absolute Basophils 0.0      Absolute Immature Granulocytes 0.0      Absolute NRBCs 0.0     BASIC METABOLIC PANEL     Chest XR,  PA & LAT   Final Result   IMPRESSION: Negative chest.               Medical Decision Making  The patient's presentation was of high complexity (an acute health issue posing potential threat to life or bodily function).    The patient's evaluation involved:  ordering and/or review of 3+ test(s) in this encounter (see separate area of note for details)  independent interpretation of testing performed by another health professional (CXR)    The patient's management necessitated only low risk treatment.      Assessments & Plan (with Medical Decision Making)   Patient presenting with pleuritic left-sided chest pain. Vitals in the ED notable for initial borderline tachycardia. Nursing notes reviewed. Initial differential diagnosis includes but not limited to intercostal spasm/strain, PTX, PE, pleurisy secondary to viral infection, ACS.     ECG shows NSR without evidence of acute ischemia, high-sensitivity troponin negative - ACS very unlikely. VTE risk factor profile low, does have tachycardia and pleuritic pain - is appropriate for D-dimer risk stratification. D-dimer undetectably low - PE very unlikely. Character and severity of pain less severe than would be expected for aortic dissection. No substernal component, ECG change, nor positional aspect to suggest pericarditis. CXR without evidence of pneumonia, pneumothorax, pleural effusion, nor other visualized pathology.  Viral influenza/COVID/RSV swab negative.     Patient declined medications in the ED, noted having ibuprofen with him.     The complete clinical picture is most consistent with chest wall pain likely  2/2 intercostal spasm from recent coughing caused by viral URI. After counseling on the diagnosis, work-up, and treatment plan, the patient was discharged to home. The patient was advised to follow-up with primary care in a few days. The patient was advised to return to the ED if worsening symptoms, shortness of breath, or if there are any urgent/life-threatening concerns.     Final diagnoses:   Pleuritic chest pain   Viral URI with cough     New Prescriptions    LIDOCAINE (LIDOCARE) 4 % PATCH    Place 1 patch onto the skin every 24 hours Apply to painful area for 12 hours, then keep off for 12 hours before applying another patch.    NAPROXEN (NAPROSYN) 500 MG TABLET    Take 1 tablet (500 mg) by mouth 2 times daily as needed for moderate pain (4-6) Take with meals.     --  Everett Ovalle MD   Emergency Medicine   [facility] EMERGENCY DEPARTMENT  3/25/2023     Everett Ovalle MD  03/25/23 0422

## 2023-03-26 LAB
ATRIAL RATE - MUSE: 94 BPM
DIASTOLIC BLOOD PRESSURE - MUSE: NORMAL MMHG
INTERPRETATION ECG - MUSE: NORMAL
P AXIS - MUSE: 55 DEGREES
PR INTERVAL - MUSE: 132 MS
QRS DURATION - MUSE: 90 MS
QT - MUSE: 360 MS
QTC - MUSE: 450 MS
R AXIS - MUSE: 15 DEGREES
SYSTOLIC BLOOD PRESSURE - MUSE: NORMAL MMHG
T AXIS - MUSE: 39 DEGREES
VENTRICULAR RATE- MUSE: 94 BPM